# Patient Record
Sex: FEMALE | Race: WHITE | NOT HISPANIC OR LATINO
[De-identification: names, ages, dates, MRNs, and addresses within clinical notes are randomized per-mention and may not be internally consistent; named-entity substitution may affect disease eponyms.]

---

## 2024-03-20 ENCOUNTER — APPOINTMENT (OUTPATIENT)
Dept: UROGYNECOLOGY | Facility: CLINIC | Age: 54
End: 2024-03-20
Payer: COMMERCIAL

## 2024-03-20 VITALS
WEIGHT: 172 LBS | HEIGHT: 67 IN | DIASTOLIC BLOOD PRESSURE: 87 MMHG | BODY MASS INDEX: 27 KG/M2 | SYSTOLIC BLOOD PRESSURE: 148 MMHG | HEART RATE: 82 BPM

## 2024-03-20 PROBLEM — Z00.00 ENCOUNTER FOR PREVENTIVE HEALTH EXAMINATION: Status: ACTIVE | Noted: 2024-03-20

## 2024-03-20 LAB
BILIRUB UR QL STRIP: NORMAL
CLARITY UR: CLEAR
COLLECTION METHOD: NORMAL
GLUCOSE UR-MCNC: NORMAL
HCG UR QL: 0.2 EU/DL
HGB UR QL STRIP.AUTO: NORMAL
KETONES UR-MCNC: NORMAL
LEUKOCYTE ESTERASE UR QL STRIP: NORMAL
NITRITE UR QL STRIP: NORMAL
PH UR STRIP: 6
PROT UR STRIP-MCNC: NORMAL
SP GR UR STRIP: 1.01

## 2024-03-20 PROCEDURE — 99204 OFFICE O/P NEW MOD 45 MIN: CPT | Mod: 25

## 2024-03-20 PROCEDURE — 51701 INSERT BLADDER CATHETER: CPT

## 2024-03-20 PROCEDURE — 81003 URINALYSIS AUTO W/O SCOPE: CPT | Mod: QW

## 2024-03-20 NOTE — PHYSICAL EXAM
[Chaperone Present] : A chaperone was present in the examining room during all aspects of the physical examination [No Acute Distress] : in no acute distress [Oriented x3] : oriented to person, place, and time [Normal Memory] : ~T memory was ~L unimpaired [Normal Mood/Affect] : mood and affect are normal [Labia Majora] : were normal [Labia Minora] : were normal [Rest Stand Full Reduced ____ degree] : Q-Tip Test: Rest Stand Full Reduced [unfilled] degree [Normal Appearance] : general appearance was normal [Aa ____] : Aa [unfilled] [Ba ____] : Ba [unfilled] [GH ____] : GH [unfilled] [C ____] : C [unfilled] [PB ____] : PB [unfilled] [Ap ____] : Ap [unfilled] [TVL ____] : TVL  [unfilled] [Bp ____] : Bp [unfilled] [D ____] : D [unfilled] [Normal] : no abnormalities [Anxiety] : patient is not anxious [Tenderness] : ~T no ~M abdominal tenderness observed [Distended] : not distended [de-identified] : uterus feels enlarged

## 2024-03-20 NOTE — HISTORY OF PRESENT ILLNESS
[Rectal Prolapse] : mild [Feelings Of Urinary Urgency] : moderate [Unable To Restrain Bowel Movement] : no [Urinary Tract Infection] : no [x1] : nocturia once nightly [Stool Visible Blood] : no [Incomplete Emptying Of Stool] : no [de-identified] : 8-10 [FreeTextEntry1] :  Sadie presents for surgical consultation with a h/o uterovaginal prolapse. She has dysmenorrhea and heavy periods and is scheduling hysterectomy with Dr Harrington, she desires concomitant prolapse repair.   PSH: Appendectomy.

## 2024-03-20 NOTE — DISCUSSION/SUMMARY
[FreeTextEntry1] :  We reviewed management options for her prolapse including: observation, pelvic floor exercises with and without PT, pessary, and surgical management. She is scheduling surgery with Dr Harrington and therefore we focused our discussion on surgical management with hysterectomy. We reviewed both mesh and non-mesh options. We reviewed risks of each procedure including recurrence rates and mesh complications. She is interested in either a SC vs USLS. If SC, I recommend GARRY, we reviewed rationale for this. She denies a h/o abnormal paps or other contraindications. If USLS, we discussed total hysterectomy. Written IUGA information on SC and USLS provided for her to review. I recommend preop workup with URD testing.   She will consider her prolapse surgical options further and let me know once she has made a decision.

## 2024-03-20 NOTE — REASON FOR VISIT
[Questionnaire Received] : Patient questionnaire received [Pelvic Organ Prolapse] : pelvic organ prolapse [Intake Form Reviewed] : Patient intake form with past medical history, surgical history, family history and social history reviewed today

## 2024-03-21 ENCOUNTER — NON-APPOINTMENT (OUTPATIENT)
Age: 54
End: 2024-03-21

## 2024-03-21 DIAGNOSIS — Z82.5 FAMILY HISTORY OF ASTHMA AND OTHER CHRONIC LOWER RESPIRATORY DISEASES: ICD-10-CM

## 2024-03-21 DIAGNOSIS — I51.9 HEART DISEASE, UNSPECIFIED: ICD-10-CM

## 2024-03-21 DIAGNOSIS — Z87.828 PERSONAL HISTORY OF OTHER (HEALED) PHYSICAL INJURY AND TRAUMA: ICD-10-CM

## 2024-03-21 DIAGNOSIS — Z83.3 FAMILY HISTORY OF DIABETES MELLITUS: ICD-10-CM

## 2024-03-21 DIAGNOSIS — Z87.09 PERSONAL HISTORY OF OTHER DISEASES OF THE RESPIRATORY SYSTEM: ICD-10-CM

## 2024-03-21 LAB
APPEARANCE: CLEAR
BACTERIA: NEGATIVE /HPF
BILIRUBIN URINE: NEGATIVE
BLOOD URINE: NEGATIVE
CAST: 0 /LPF
COLOR: YELLOW
EPITHELIAL CELLS: 0 /HPF
GLUCOSE QUALITATIVE U: NEGATIVE MG/DL
KETONES URINE: NEGATIVE MG/DL
LEUKOCYTE ESTERASE URINE: NEGATIVE
MICROSCOPIC-UA: NORMAL
NITRITE URINE: NEGATIVE
PH URINE: 6.5
PROTEIN URINE: NEGATIVE MG/DL
RED BLOOD CELLS URINE: 0 /HPF
SPECIFIC GRAVITY URINE: 1.01
UROBILINOGEN URINE: 0.2 MG/DL
WHITE BLOOD CELLS URINE: 0 /HPF

## 2024-03-21 RX ORDER — METOPROLOL SUCCINATE 25 MG/1
25 TABLET, EXTENDED RELEASE ORAL
Refills: 0 | Status: ACTIVE | COMMUNITY

## 2024-03-21 RX ORDER — ESCITALOPRAM OXALATE 10 MG/1
10 TABLET, FILM COATED ORAL
Refills: 0 | Status: ACTIVE | COMMUNITY

## 2024-03-21 RX ORDER — NORETHINDRONE ACETATE AND ETHINYL ESTRADIOL .5; .0025 MG/1; MG/1
TABLET ORAL
Refills: 0 | Status: ACTIVE | COMMUNITY

## 2024-03-22 LAB — BACTERIA UR CULT: NORMAL

## 2024-04-19 ENCOUNTER — OUTPATIENT (OUTPATIENT)
Dept: OUTPATIENT SERVICES | Facility: HOSPITAL | Age: 54
LOS: 1 days | End: 2024-04-19
Payer: COMMERCIAL

## 2024-04-19 ENCOUNTER — APPOINTMENT (OUTPATIENT)
Dept: UROGYNECOLOGY | Facility: CLINIC | Age: 54
End: 2024-04-19
Payer: COMMERCIAL

## 2024-04-19 VITALS
WEIGHT: 175 LBS | DIASTOLIC BLOOD PRESSURE: 82 MMHG | SYSTOLIC BLOOD PRESSURE: 132 MMHG | HEIGHT: 67 IN | BODY MASS INDEX: 27.47 KG/M2 | HEART RATE: 76 BPM

## 2024-04-19 DIAGNOSIS — Z01.818 ENCOUNTER FOR OTHER PREPROCEDURAL EXAMINATION: ICD-10-CM

## 2024-04-19 PROCEDURE — 51797 INTRAABDOMINAL PRESSURE TEST: CPT

## 2024-04-19 PROCEDURE — 51797 INTRAABDOMINAL PRESSURE TEST: CPT | Mod: 26

## 2024-04-19 PROCEDURE — 51784 ANAL/URINARY MUSCLE STUDY: CPT

## 2024-04-19 PROCEDURE — 51729 CYSTOMETROGRAM W/VP&UP: CPT | Mod: 26

## 2024-04-19 PROCEDURE — 51729 CYSTOMETROGRAM W/VP&UP: CPT

## 2024-04-19 PROCEDURE — 51784 ANAL/URINARY MUSCLE STUDY: CPT | Mod: 26

## 2024-06-02 PROBLEM — N81.11 MIDLINE CYSTOCELE: Status: ACTIVE | Noted: 2024-03-20

## 2024-06-02 PROBLEM — N81.2 INCOMPLETE UTEROVAGINAL PROLAPSE: Status: ACTIVE | Noted: 2024-03-20

## 2024-06-02 PROBLEM — N39.3 STRESS INCONTINENCE, FEMALE: Status: ACTIVE | Noted: 2024-04-19

## 2024-06-02 PROBLEM — R35.0 URINATION FREQUENCY: Status: ACTIVE | Noted: 2024-03-20

## 2024-06-04 ENCOUNTER — OUTPATIENT (OUTPATIENT)
Dept: OUTPATIENT SERVICES | Facility: HOSPITAL | Age: 54
LOS: 1 days | End: 2024-06-04
Payer: COMMERCIAL

## 2024-06-04 ENCOUNTER — APPOINTMENT (OUTPATIENT)
Dept: UROGYNECOLOGY | Facility: CLINIC | Age: 54
End: 2024-06-04
Payer: COMMERCIAL

## 2024-06-04 VITALS
SYSTOLIC BLOOD PRESSURE: 153 MMHG | WEIGHT: 175 LBS | HEIGHT: 67 IN | DIASTOLIC BLOOD PRESSURE: 86 MMHG | BODY MASS INDEX: 27.47 KG/M2 | HEART RATE: 85 BPM

## 2024-06-04 VITALS
WEIGHT: 184.09 LBS | OXYGEN SATURATION: 98 % | TEMPERATURE: 98 F | HEART RATE: 75 BPM | DIASTOLIC BLOOD PRESSURE: 77 MMHG | RESPIRATION RATE: 12 BRPM | SYSTOLIC BLOOD PRESSURE: 128 MMHG | HEIGHT: 67 IN

## 2024-06-04 DIAGNOSIS — Z98.890 OTHER SPECIFIED POSTPROCEDURAL STATES: Chronic | ICD-10-CM

## 2024-06-04 DIAGNOSIS — N93.9 ABNORMAL UTERINE AND VAGINAL BLEEDING, UNSPECIFIED: ICD-10-CM

## 2024-06-04 DIAGNOSIS — R10.2 PELVIC AND PERINEAL PAIN: ICD-10-CM

## 2024-06-04 DIAGNOSIS — R35.0 FREQUENCY OF MICTURITION: ICD-10-CM

## 2024-06-04 DIAGNOSIS — N92.0 EXCESSIVE AND FREQUENT MENSTRUATION WITH REGULAR CYCLE: ICD-10-CM

## 2024-06-04 DIAGNOSIS — N39.3 STRESS INCONTINENCE (FEMALE) (MALE): ICD-10-CM

## 2024-06-04 DIAGNOSIS — Z29.9 ENCOUNTER FOR PROPHYLACTIC MEASURES, UNSPECIFIED: ICD-10-CM

## 2024-06-04 DIAGNOSIS — N81.11 CYSTOCELE, MIDLINE: ICD-10-CM

## 2024-06-04 DIAGNOSIS — Z01.818 ENCOUNTER FOR OTHER PREPROCEDURAL EXAMINATION: ICD-10-CM

## 2024-06-04 DIAGNOSIS — Z90.49 ACQUIRED ABSENCE OF OTHER SPECIFIED PARTS OF DIGESTIVE TRACT: Chronic | ICD-10-CM

## 2024-06-04 DIAGNOSIS — N83.202 UNSPECIFIED OVARIAN CYST, LEFT SIDE: ICD-10-CM

## 2024-06-04 DIAGNOSIS — N81.2 INCOMPLETE UTEROVAGINAL PROLAPSE: ICD-10-CM

## 2024-06-04 LAB
A1C WITH ESTIMATED AVERAGE GLUCOSE RESULT: 4.9 % — SIGNIFICANT CHANGE UP (ref 4–5.6)
ANION GAP SERPL CALC-SCNC: 13 MMOL/L — SIGNIFICANT CHANGE UP (ref 5–17)
BLD GP AB SCN SERPL QL: NEGATIVE — SIGNIFICANT CHANGE UP
BUN SERPL-MCNC: 10 MG/DL — SIGNIFICANT CHANGE UP (ref 7–23)
CALCIUM SERPL-MCNC: 9.1 MG/DL — SIGNIFICANT CHANGE UP (ref 8.4–10.5)
CHLORIDE SERPL-SCNC: 104 MMOL/L — SIGNIFICANT CHANGE UP (ref 96–108)
CO2 SERPL-SCNC: 20 MMOL/L — LOW (ref 22–31)
CREAT SERPL-MCNC: 0.64 MG/DL — SIGNIFICANT CHANGE UP (ref 0.5–1.3)
EGFR: 106 ML/MIN/1.73M2 — SIGNIFICANT CHANGE UP
ESTIMATED AVERAGE GLUCOSE: 94 MG/DL — SIGNIFICANT CHANGE UP (ref 68–114)
GLUCOSE SERPL-MCNC: 80 MG/DL — SIGNIFICANT CHANGE UP (ref 70–99)
HCT VFR BLD CALC: 43.7 % — SIGNIFICANT CHANGE UP (ref 34.5–45)
HGB BLD-MCNC: 15.4 G/DL — SIGNIFICANT CHANGE UP (ref 11.5–15.5)
MCHC RBC-ENTMCNC: 30.3 PG — SIGNIFICANT CHANGE UP (ref 27–34)
MCHC RBC-ENTMCNC: 35.2 GM/DL — SIGNIFICANT CHANGE UP (ref 32–36)
MCV RBC AUTO: 86 FL — SIGNIFICANT CHANGE UP (ref 80–100)
NRBC # BLD: 0 /100 WBCS — SIGNIFICANT CHANGE UP (ref 0–0)
PLATELET # BLD AUTO: 316 K/UL — SIGNIFICANT CHANGE UP (ref 150–400)
POTASSIUM SERPL-MCNC: 3.8 MMOL/L — SIGNIFICANT CHANGE UP (ref 3.5–5.3)
POTASSIUM SERPL-SCNC: 3.8 MMOL/L — SIGNIFICANT CHANGE UP (ref 3.5–5.3)
RBC # BLD: 5.08 M/UL — SIGNIFICANT CHANGE UP (ref 3.8–5.2)
RBC # FLD: 13 % — SIGNIFICANT CHANGE UP (ref 10.3–14.5)
RH IG SCN BLD-IMP: NEGATIVE — SIGNIFICANT CHANGE UP
SODIUM SERPL-SCNC: 137 MMOL/L — SIGNIFICANT CHANGE UP (ref 135–145)
WBC # BLD: 8.43 K/UL — SIGNIFICANT CHANGE UP (ref 3.8–10.5)
WBC # FLD AUTO: 8.43 K/UL — SIGNIFICANT CHANGE UP (ref 3.8–10.5)

## 2024-06-04 PROCEDURE — 86850 RBC ANTIBODY SCREEN: CPT

## 2024-06-04 PROCEDURE — 85027 COMPLETE CBC AUTOMATED: CPT

## 2024-06-04 PROCEDURE — G0463: CPT

## 2024-06-04 PROCEDURE — 86900 BLOOD TYPING SEROLOGIC ABO: CPT

## 2024-06-04 PROCEDURE — 36415 COLL VENOUS BLD VENIPUNCTURE: CPT

## 2024-06-04 PROCEDURE — 80048 BASIC METABOLIC PNL TOTAL CA: CPT

## 2024-06-04 PROCEDURE — 99214 OFFICE O/P EST MOD 30 MIN: CPT

## 2024-06-04 PROCEDURE — 99459 PELVIC EXAMINATION: CPT

## 2024-06-04 PROCEDURE — 83036 HEMOGLOBIN GLYCOSYLATED A1C: CPT

## 2024-06-04 PROCEDURE — 86901 BLOOD TYPING SEROLOGIC RH(D): CPT

## 2024-06-04 RX ORDER — CEFOTETAN DISODIUM 2 G
2 VIAL (EA) INJECTION ONCE
Refills: 0 | Status: DISCONTINUED | OUTPATIENT
Start: 2024-06-26 | End: 2024-06-27

## 2024-06-04 NOTE — H&P PST ADULT - NSICDXPASTSURGICALHX_GEN_ALL_CORE_FT
PAST SURGICAL HISTORY:  History of appendectomy     History of elbow surgery     S/P lumbar spine operation

## 2024-06-04 NOTE — H&P PST ADULT - PROBLEM SELECTOR PLAN 1
Laparoscopic Robotic Sacral Colpopexy, Uterosacral Suspension, Ligament Anterior Posterior Colporrhaphy Repair Possible Midurethral Sling, Laparoscopic Total Supracervical Hysterectomy, Laparoscopic Bilateral Salpingectomy on 6/26/24.  CBC, BMP, T&S, HemA1c done today at [facility].   UC done today in Dr. Dawkins office.   Pre op instructions, including chlorhexidine, provided and all questions answered.   UCG on DOS, specimen container provided.

## 2024-06-04 NOTE — H&P PST ADULT - NSICDXPASTMEDICALHX_GEN_ALL_CORE_FT
PAST MEDICAL HISTORY:  Anxiety     Asthma, exercise induced     Dysmenorrhea     Menorrhagia     PATRICIA (stress urinary incontinence, female)     Uterovaginal prolapse

## 2024-06-04 NOTE — H&P PST ADULT - HISTORY OF PRESENT ILLNESS
54 year old female with PMH of anxiety (on Metoprolol for anxiety symptoms), exercise induced asthma (states she has not used inhaler in years), uterovaginal prolapse, dysmenorrhea and heavy periods. She presents today to PST prior to scheduled Laparoscopic Robotic Sacral Colpopexy, Uterosacral Suspension, Ligament Anterior Posterior Colporrhaphy Repair Possible Midurethral Sling, Laparoscopic Total Supracervical Hysterectomy, Laparoscopic Bilateral Salpingectomy on 6/26/24. Patient denies recent fever, chills, chest pain, SOB, palpitations, or recent exposure to COVID-19.

## 2024-06-04 NOTE — H&P PST ADULT - ASSESSMENT
Activity: Patient states she walks for exercise and is active with her dogs and caring for her kids, and has no symptoms.     DASI: 8.97    Mallampati: 1    Dental: Denies loose teeth or dentures.     CAPRINI VTE 2.0 SCORE [CLOT updated 2019]    AGE RELATED RISK FACTORS                                                       MOBILITY RELATED FACTORS  [X ] Age 41-60 years                                            (1 Point)                    [ ] Bed rest                                                        (1 Point)  [ ] Age: 61-74 years                                           (2 Points)                  [ ] Plaster cast                                                   (2 Points)  [ ] Age= 75 years                                              (3 Points)                    [ ] Bed bound for more than 72 hours                 (2 Points)    DISEASE RELATED RISK FACTORS                                               GENDER SPECIFIC FACTORS  [ ] Edema in the lower extremities                       (1 Point)              [ ] Pregnancy                                                     (1 Point)  [ ] Varicose veins                                               (1 Point)                     [ ] Post-partum < 6 weeks                                   (1 Point)             [X ] BMI > 25 Kg/m2                                            (1 Point)                     [ ] Hormonal therapy  or oral contraception          (1 Point)                 [ ] Sepsis (in the previous month)                        (1 Point)               [ ] History of pregnancy complications                 (1 point)  [ ] Pneumonia or serious lung disease                                               [ ] Unexplained or recurrent                     (1 Point)           (in the previous month)                               (1 Point)  [ ] Abnormal pulmonary function test                     (1 Point)                 SURGERY RELATED RISK FACTORS  [ ] Acute myocardial infarction                              (1 Point)               [ ]  Section                                             (1 Point)  [ ] Congestive heart failure (in the previous month)  (1 Point)      [ ] Minor surgery                                                  (1 Point)   [ ] Inflammatory bowel disease                             (1 Point)               [ ] Arthroscopic surgery                                        (2 Points)  [ ] Central venous access                                      (2 Points)                [X ] General surgery lasting more than 45 minutes (2 points)  [ ] Malignancy- Present or previous                   (2 Points)                [ ] Elective arthroplasty                                         (5 points)    [ ] Stroke (in the previous month)                          (5 Points)                                                                                                                                                           HEMATOLOGY RELATED FACTORS                                                 TRAUMA RELATED RISK FACTORS  [ ] Prior episodes of VTE                                     (3 Points)                [ ] Fracture of the hip, pelvis, or leg                       (5 Points)  [ ] Positive family history for VTE                         (3 Points)             [ ] Acute spinal cord injury (in the previous month)  (5 Points)  [ ] Prothrombin 13169 A                                     (3 Points)               [ ] Paralysis  (less than 1 month)                             (5 Points)  [ ] Factor V Leiden                                             (3 Points)                  [ ] Multiple Trauma within 1 month                        (5 Points)  [ ] Lupus anticoagulants                                     (3 Points)                                                           [ ] Anticardiolipin antibodies                               (3 Points)                                                       [ ] High homocysteine in the blood                      (3 Points)                                             [ ] Other congenital or acquired thrombophilia      (3 Points)                                                [ ] Heparin induced thrombocytopenia                  (3 Points)                                     Total Score [    4      ]

## 2024-06-05 NOTE — HISTORY OF PRESENT ILLNESS
[FreeTextEntry1] : Sadie is a 55 yo with stage PO, occult PATRICIA, and OAB here for pre-surgical counseling.  After considering her options, she would like a SCP/ GARRY. Patient denies any new complaints.  She continues to desire surgical management and is scheduled for combined case with Dr. Harrington on 24. Her pre-operative workup is as follows:   UDS (2024): senior care 350 mL, +-300 mL, no comment on DO, PVR 0 cc. Pelvic US performed at [facility], she will request records be sent here Pap 2024 (HIE clin viewer) NILM, HPV neg [ x] UCx collected today [ ] Medical clearance   PMH: AUB PSH: LSC appendectomy, Chris Ferdinand surgery, elbow tendon repair OBGYN: denies abnormal paps,  x 4, known AUB Allergies: percocet causes feeling of "locked in" with no pain relief and feeling paralyzed

## 2024-06-05 NOTE — PHYSICAL EXAM
[Chaperone Present] : A chaperone was present in the examining room during all aspects of the physical examination [[id number]] : A chaperone was present during the pelvic exam. [No Acute Distress] : in no acute distress [Well developed] : well developed [Well Nourished] : ~L well nourished [Normal Gait] : gait was normal [Labia Majora] : were normal [Labia Minora] : were normal [Normal Appearance] : general appearance was normal [Uterine Adnexae] : were not tender and not enlarged [Normal] : no abnormalities [Exam Deferred] : was deferred [FreeTextEntry1] : General: Well appearing. Alert and oriented. No acute distress. HEENT: Normocephalic, atraumatic, extraocular muscles appear to be intact. Neck: Full range of motion, no obvious lymphadenopathy, deformities, or masses noted. Respiratory: Speaking in full sentences comfortably. Normal work of breathing. No cough during visit. Musculoskeletal: Active full range of motion in extremities. Skin: No obvious rash or skin lesions. Neuro: Oriented x3. Speech is fluent, normal rate. Psych: Normal mood and affect. [Tenderness] : ~T no ~M abdominal tenderness observed [Distended] : not distended [de-identified] : Aa 0, Ba 0, C -1, GH 3, PB 2, TVL 9, Ap -2, Bp -2 and D -4.

## 2024-06-05 NOTE — DISCUSSION/SUMMARY
[FreeTextEntry1] : Sadie is a 53 yo who presents with Stage II prolapse and occult stress urinary incontinence. We reviewed management options for her prolapse including: observation, pelvic floor exercises, resuming pessary use, surgical management. We reviewed various surgical management options including vaginal, laparoscopic/robotic, and abdominal procedures. We also reviewed both mesh and non-mesh options. She is interested in robotic supracervical hysterectomy and sacrocolpopexy. We discussed her UDS findings of PATRICIA and reviewed treatment options. She desires a midurethral sling at the time of her prolapse repair. We discussed the fact that this sling was only indicated to treat occult PATRICIA rather than OAB symptoms.    She denies a history of abnormal Pap smears in the past, and a recent Pap smear is negative. Risks and benefits of total hysterectomy vs. supracervical hysterectomy were discussed with patient, and she opted for supracervical hysterectomy. She is aware that her cervix will be left in situ and she will require continued routine screening. We discussed the risks and benefits of removing her ovaries at the time of surgery, and she would like to keep her ovaries in situ. We discussed removing ovaries only in the unlikely event that they appeared abnormal or for hemostasis. We will therefore plan to proceed with a bilateral salpingectomy only at the time of the procedure.   We reviewed risks of the surgery including but not limited to: bleeding, infection, pain, urinary retention, failure to reduce prolapse, prolapse recurrence, persistence or worsening of stress urinary incontinence, development of urge incontinence, voiding dysfunction, dyspareunia, bowel obstruction, venous thromboembolism (VTE), fistula formation, neuropathy, and mesh erosion. We discussed the risk of possible conversion to open surgery via exploratory laparotomy. We discussed the risk of injury to her bladder, ureters, urethra, vagina, rectum and bowel. We discussed the possibility of going home with a catheter. The risks and procedure details were explained in layman's terms and she expressed understanding of all of these risks. We discussed the elective nature of the surgery and we discussed that she is choosing to undergo this surgery despite awareness and understanding of procedure risks. We reviewed her hospital stay as well as preoperative and postoperative instructions.   Patient signed consent for: pelvic exam under anesthesia, robotic/laparoscopic-assisted supracervical hysterectomy, bilateral salpingectomy, sacrocolpopexy, midurethral sling with mesh, cystoscopy; possible anterior/posterior repair, oophorectomy, laparotomy. Pt understands that pelvic exam under anesthesia can be performed by learners (medical students/residents/fellows).  Patient verbalized understanding. All questions answered.

## 2024-06-06 ENCOUNTER — NON-APPOINTMENT (OUTPATIENT)
Age: 54
End: 2024-06-06

## 2024-06-06 LAB — BACTERIA UR CULT: NORMAL

## 2024-06-25 ENCOUNTER — TRANSCRIPTION ENCOUNTER (OUTPATIENT)
Age: 54
End: 2024-06-25

## 2024-06-26 ENCOUNTER — APPOINTMENT (OUTPATIENT)
Dept: UROGYNECOLOGY | Facility: HOSPITAL | Age: 54
End: 2024-06-26
Payer: COMMERCIAL

## 2024-06-26 ENCOUNTER — INPATIENT (INPATIENT)
Facility: HOSPITAL | Age: 54
LOS: 0 days | Discharge: HOME | DRG: 743 | End: 2024-06-27
Attending: STUDENT IN AN ORGANIZED HEALTH CARE EDUCATION/TRAINING PROGRAM | Admitting: STUDENT IN AN ORGANIZED HEALTH CARE EDUCATION/TRAINING PROGRAM
Payer: COMMERCIAL

## 2024-06-26 VITALS
TEMPERATURE: 98 F | HEART RATE: 78 BPM | WEIGHT: 184.09 LBS | SYSTOLIC BLOOD PRESSURE: 137 MMHG | OXYGEN SATURATION: 99 % | HEIGHT: 67.01 IN | RESPIRATION RATE: 16 BRPM | DIASTOLIC BLOOD PRESSURE: 81 MMHG

## 2024-06-26 DIAGNOSIS — Z90.49 ACQUIRED ABSENCE OF OTHER SPECIFIED PARTS OF DIGESTIVE TRACT: Chronic | ICD-10-CM

## 2024-06-26 DIAGNOSIS — Z98.890 OTHER SPECIFIED POSTPROCEDURAL STATES: Chronic | ICD-10-CM

## 2024-06-26 DIAGNOSIS — N92.0 EXCESSIVE AND FREQUENT MENSTRUATION WITH REGULAR CYCLE: ICD-10-CM

## 2024-06-26 DIAGNOSIS — R35.0 FREQUENCY OF MICTURITION: ICD-10-CM

## 2024-06-26 LAB
GLUCOSE BLDC GLUCOMTR-MCNC: 83 MG/DL — SIGNIFICANT CHANGE UP (ref 70–99)
HCG UR QL: NEGATIVE — SIGNIFICANT CHANGE UP
HCT VFR BLD CALC: 43.5 % — SIGNIFICANT CHANGE UP (ref 34.5–45)
HGB BLD-MCNC: 15.4 G/DL — SIGNIFICANT CHANGE UP (ref 11.5–15.5)

## 2024-06-26 PROCEDURE — 57425 LAPAROSCOPY SURG COLPOPEXY: CPT

## 2024-06-26 PROCEDURE — 88305 TISSUE EXAM BY PATHOLOGIST: CPT | Mod: 26

## 2024-06-26 DEVICE — SURGIFLO MATRIX WITH THROMBIN KIT: Type: IMPLANTABLE DEVICE | Site: BILATERAL | Status: FUNCTIONAL

## 2024-06-26 DEVICE — MESH UPSYLON Y: Type: IMPLANTABLE DEVICE | Site: BILATERAL | Status: FUNCTIONAL

## 2024-06-26 RX ORDER — METOPROLOL TARTRATE 50 MG
25 TABLET ORAL DAILY
Refills: 0 | Status: DISCONTINUED | OUTPATIENT
Start: 2024-06-26 | End: 2024-06-27

## 2024-06-26 RX ORDER — CELECOXIB 100 MG/1
400 CAPSULE ORAL ONCE
Refills: 0 | Status: COMPLETED | OUTPATIENT
Start: 2024-06-26 | End: 2024-06-26

## 2024-06-26 RX ORDER — ESCITALOPRAM OXALATE 10 MG/1
1 TABLET, FILM COATED ORAL
Refills: 0 | DISCHARGE

## 2024-06-26 RX ORDER — GABAPENTIN
600 POWDER (GRAM) MISCELLANEOUS ONCE
Refills: 0 | Status: COMPLETED | OUTPATIENT
Start: 2024-06-26 | End: 2024-06-26

## 2024-06-26 RX ORDER — LIDOCAINE HYDROCHLORIDE 20 MG/ML
0.2 INJECTION, SOLUTION EPIDURAL; INFILTRATION; INTRACAUDAL; PERINEURAL ONCE
Refills: 0 | Status: DISCONTINUED | OUTPATIENT
Start: 2024-06-26 | End: 2024-06-26

## 2024-06-26 RX ORDER — ACETAMINOPHEN 325 MG
1000 TABLET ORAL EVERY 6 HOURS
Refills: 0 | Status: DISCONTINUED | OUTPATIENT
Start: 2024-06-26 | End: 2024-06-27

## 2024-06-26 RX ORDER — METOPROLOL TARTRATE 50 MG
1 TABLET ORAL
Refills: 0 | DISCHARGE

## 2024-06-26 RX ORDER — DEXTROSE MONOHYDRATE AND SODIUM CHLORIDE 5; .3 G/100ML; G/100ML
1000 INJECTION, SOLUTION INTRAVENOUS
Refills: 0 | Status: DISCONTINUED | OUTPATIENT
Start: 2024-06-26 | End: 2024-06-27

## 2024-06-26 RX ORDER — KETOROLAC TROMETHAMINE 30 MG/ML
15 INJECTION, SOLUTION INTRAMUSCULAR ONCE
Refills: 0 | Status: DISCONTINUED | OUTPATIENT
Start: 2024-06-26 | End: 2024-06-26

## 2024-06-26 RX ORDER — ONDANSETRON HYDROCHLORIDE 2 MG/ML
8 INJECTION INTRAMUSCULAR; INTRAVENOUS EVERY 8 HOURS
Refills: 0 | Status: DISCONTINUED | OUTPATIENT
Start: 2024-06-26 | End: 2024-06-27

## 2024-06-26 RX ORDER — HYDROMORPHONE HCL 0.2 MG/ML
0.5 INJECTION, SOLUTION INTRAVENOUS
Refills: 0 | Status: DISCONTINUED | OUTPATIENT
Start: 2024-06-26 | End: 2024-06-26

## 2024-06-26 RX ORDER — SODIUM CHLORIDE 0.9 % (FLUSH) 0.9 %
3 SYRINGE (ML) INJECTION EVERY 8 HOURS
Refills: 0 | Status: DISCONTINUED | OUTPATIENT
Start: 2024-06-26 | End: 2024-06-26

## 2024-06-26 RX ORDER — NORETHINDRONE AND ETHINYL ESTRADIOL 0.4-0.035
1 KIT ORAL
Refills: 0 | DISCHARGE

## 2024-06-26 RX ORDER — SIMETHICONE 40MG/0.6ML
80 SUSPENSION, DROPS(FINAL DOSAGE FORM)(ML) ORAL EVERY 6 HOURS
Refills: 0 | Status: DISCONTINUED | OUTPATIENT
Start: 2024-06-26 | End: 2024-06-27

## 2024-06-26 RX ORDER — ESCITALOPRAM OXALATE 20 MG/1
10 TABLET, FILM COATED ORAL DAILY
Refills: 0 | Status: DISCONTINUED | OUTPATIENT
Start: 2024-06-26 | End: 2024-06-27

## 2024-06-26 RX ORDER — ACETAMINOPHEN 325 MG
1000 TABLET ORAL ONCE
Refills: 0 | Status: COMPLETED | OUTPATIENT
Start: 2024-06-26 | End: 2024-06-26

## 2024-06-26 RX ORDER — OXYCODONE HYDROCHLORIDE 100 MG/5ML
5 SOLUTION ORAL EVERY 6 HOURS
Refills: 0 | Status: DISCONTINUED | OUTPATIENT
Start: 2024-06-26 | End: 2024-06-27

## 2024-06-26 RX ADMIN — CELECOXIB 400 MILLIGRAM(S): 100 CAPSULE ORAL at 07:22

## 2024-06-26 RX ADMIN — HYDROMORPHONE HCL 0.5 MILLIGRAM(S): 0.2 INJECTION, SOLUTION INTRAVENOUS at 14:30

## 2024-06-26 RX ADMIN — HYDROMORPHONE HCL 0.5 MILLIGRAM(S): 0.2 INJECTION, SOLUTION INTRAVENOUS at 14:15

## 2024-06-26 RX ADMIN — HYDROMORPHONE HCL 0.5 MILLIGRAM(S): 0.2 INJECTION, SOLUTION INTRAVENOUS at 13:44

## 2024-06-26 RX ADMIN — DEXTROSE MONOHYDRATE AND SODIUM CHLORIDE 75 MILLILITER(S): 5; .3 INJECTION, SOLUTION INTRAVENOUS at 13:44

## 2024-06-26 RX ADMIN — Medication 25 MILLIGRAM(S): at 21:20

## 2024-06-26 RX ADMIN — Medication 600 MILLIGRAM(S): at 21:50

## 2024-06-26 RX ADMIN — HYDROMORPHONE HCL 0.5 MILLIGRAM(S): 0.2 INJECTION, SOLUTION INTRAVENOUS at 13:59

## 2024-06-26 RX ADMIN — Medication 600 MILLIGRAM(S): at 21:21

## 2024-06-26 RX ADMIN — Medication 1000 MILLIGRAM(S): at 18:11

## 2024-06-26 RX ADMIN — Medication 600 MILLIGRAM(S): at 07:22

## 2024-06-26 RX ADMIN — KETOROLAC TROMETHAMINE 15 MILLIGRAM(S): 30 INJECTION, SOLUTION INTRAMUSCULAR at 15:10

## 2024-06-26 RX ADMIN — Medication 1000 MILLIGRAM(S): at 07:22

## 2024-06-26 RX ADMIN — Medication 1 APPLICATION(S): at 07:23

## 2024-06-26 RX ADMIN — KETOROLAC TROMETHAMINE 15 MILLIGRAM(S): 30 INJECTION, SOLUTION INTRAMUSCULAR at 15:25

## 2024-06-27 ENCOUNTER — TRANSCRIPTION ENCOUNTER (OUTPATIENT)
Age: 54
End: 2024-06-27

## 2024-06-27 VITALS
TEMPERATURE: 99 F | DIASTOLIC BLOOD PRESSURE: 72 MMHG | RESPIRATION RATE: 18 BRPM | SYSTOLIC BLOOD PRESSURE: 131 MMHG | HEART RATE: 65 BPM | OXYGEN SATURATION: 97 %

## 2024-06-27 LAB
ANION GAP SERPL CALC-SCNC: 12 MMOL/L — SIGNIFICANT CHANGE UP (ref 5–17)
BUN SERPL-MCNC: 11 MG/DL — SIGNIFICANT CHANGE UP (ref 7–23)
CALCIUM SERPL-MCNC: 8.1 MG/DL — LOW (ref 8.4–10.5)
CHLORIDE SERPL-SCNC: 101 MMOL/L — SIGNIFICANT CHANGE UP (ref 96–108)
CO2 SERPL-SCNC: 21 MMOL/L — LOW (ref 22–31)
CREAT SERPL-MCNC: 0.65 MG/DL — SIGNIFICANT CHANGE UP (ref 0.5–1.3)
EGFR: 105 ML/MIN/1.73M2 — SIGNIFICANT CHANGE UP
GLUCOSE SERPL-MCNC: 98 MG/DL — SIGNIFICANT CHANGE UP (ref 70–99)
HCT VFR BLD CALC: 38.6 % — SIGNIFICANT CHANGE UP (ref 34.5–45)
HGB BLD-MCNC: 13 G/DL — SIGNIFICANT CHANGE UP (ref 11.5–15.5)
MCHC RBC-ENTMCNC: 29.9 PG — SIGNIFICANT CHANGE UP (ref 27–34)
MCHC RBC-ENTMCNC: 33.7 GM/DL — SIGNIFICANT CHANGE UP (ref 32–36)
MCV RBC AUTO: 88.7 FL — SIGNIFICANT CHANGE UP (ref 80–100)
NRBC # BLD: 0 /100 WBCS — SIGNIFICANT CHANGE UP (ref 0–0)
PLATELET # BLD AUTO: 262 K/UL — SIGNIFICANT CHANGE UP (ref 150–400)
POTASSIUM SERPL-MCNC: 4.1 MMOL/L — SIGNIFICANT CHANGE UP (ref 3.5–5.3)
POTASSIUM SERPL-SCNC: 4.1 MMOL/L — SIGNIFICANT CHANGE UP (ref 3.5–5.3)
RBC # BLD: 4.35 M/UL — SIGNIFICANT CHANGE UP (ref 3.8–5.2)
RBC # FLD: 13 % — SIGNIFICANT CHANGE UP (ref 10.3–14.5)
SODIUM SERPL-SCNC: 134 MMOL/L — LOW (ref 135–145)
WBC # BLD: 11.44 K/UL — HIGH (ref 3.8–10.5)
WBC # FLD AUTO: 11.44 K/UL — HIGH (ref 3.8–10.5)

## 2024-06-27 PROCEDURE — C1889: CPT

## 2024-06-27 PROCEDURE — 85018 HEMOGLOBIN: CPT

## 2024-06-27 PROCEDURE — C9399: CPT

## 2024-06-27 PROCEDURE — 85014 HEMATOCRIT: CPT

## 2024-06-27 PROCEDURE — 88305 TISSUE EXAM BY PATHOLOGIST: CPT

## 2024-06-27 PROCEDURE — 86901 BLOOD TYPING SEROLOGIC RH(D): CPT

## 2024-06-27 PROCEDURE — 81025 URINE PREGNANCY TEST: CPT

## 2024-06-27 PROCEDURE — C1781: CPT

## 2024-06-27 PROCEDURE — 86900 BLOOD TYPING SEROLOGIC ABO: CPT

## 2024-06-27 PROCEDURE — 80048 BASIC METABOLIC PNL TOTAL CA: CPT

## 2024-06-27 PROCEDURE — 85027 COMPLETE CBC AUTOMATED: CPT

## 2024-06-27 PROCEDURE — 86850 RBC ANTIBODY SCREEN: CPT

## 2024-06-27 PROCEDURE — 82962 GLUCOSE BLOOD TEST: CPT

## 2024-06-27 PROCEDURE — S2900: CPT

## 2024-06-27 RX ORDER — OXYCODONE HYDROCHLORIDE 100 MG/5ML
1 SOLUTION ORAL
Qty: 5 | Refills: 0
Start: 2024-06-27

## 2024-06-27 RX ORDER — ACETAMINOPHEN 325 MG
2 TABLET ORAL
Qty: 0 | Refills: 0 | DISCHARGE
Start: 2024-06-27

## 2024-06-27 RX ADMIN — Medication 600 MILLIGRAM(S): at 09:37

## 2024-06-27 RX ADMIN — Medication 80 MILLIGRAM(S): at 03:44

## 2024-06-27 RX ADMIN — Medication 1000 MILLIGRAM(S): at 00:40

## 2024-06-27 RX ADMIN — Medication 600 MILLIGRAM(S): at 04:15

## 2024-06-27 RX ADMIN — Medication 600 MILLIGRAM(S): at 03:44

## 2024-06-27 RX ADMIN — Medication 1000 MILLIGRAM(S): at 06:20

## 2024-06-27 RX ADMIN — Medication 1000 MILLIGRAM(S): at 12:16

## 2024-06-27 RX ADMIN — Medication 1000 MILLIGRAM(S): at 12:46

## 2024-06-27 RX ADMIN — ESCITALOPRAM OXALATE 10 MILLIGRAM(S): 20 TABLET, FILM COATED ORAL at 09:40

## 2024-06-27 RX ADMIN — Medication 1000 MILLIGRAM(S): at 06:55

## 2024-06-27 RX ADMIN — Medication 600 MILLIGRAM(S): at 09:59

## 2024-06-27 RX ADMIN — Medication 1000 MILLIGRAM(S): at 00:08

## 2024-07-06 LAB — SURGICAL PATHOLOGY STUDY: SIGNIFICANT CHANGE UP

## 2024-07-09 ENCOUNTER — APPOINTMENT (OUTPATIENT)
Dept: UROGYNECOLOGY | Facility: CLINIC | Age: 54
End: 2024-07-09
Payer: COMMERCIAL

## 2024-07-09 DIAGNOSIS — L03.818 CELLULITIS OF OTHER SITES: ICD-10-CM

## 2024-07-09 PROBLEM — J45.990 EXERCISE INDUCED BRONCHOSPASM: Chronic | Status: ACTIVE | Noted: 2024-06-04

## 2024-07-09 PROBLEM — F41.9 ANXIETY DISORDER, UNSPECIFIED: Chronic | Status: ACTIVE | Noted: 2024-06-04

## 2024-07-09 PROBLEM — N94.6 DYSMENORRHEA, UNSPECIFIED: Chronic | Status: ACTIVE | Noted: 2024-06-04

## 2024-07-09 PROBLEM — N39.3 STRESS INCONTINENCE (FEMALE) (MALE): Chronic | Status: ACTIVE | Noted: 2024-06-04

## 2024-07-09 PROBLEM — N81.4 UTEROVAGINAL PROLAPSE, UNSPECIFIED: Chronic | Status: ACTIVE | Noted: 2024-06-04

## 2024-07-09 PROBLEM — N92.0 EXCESSIVE AND FREQUENT MENSTRUATION WITH REGULAR CYCLE: Chronic | Status: ACTIVE | Noted: 2024-06-04

## 2024-07-09 PROCEDURE — 99024 POSTOP FOLLOW-UP VISIT: CPT

## 2024-07-09 RX ORDER — AMOXICILLIN AND CLAVULANATE POTASSIUM 875; 125 MG/1; MG/1
875-125 TABLET, COATED ORAL
Qty: 14 | Refills: 0 | Status: ACTIVE | COMMUNITY
Start: 2024-07-09 | End: 1900-01-01

## 2024-07-23 ENCOUNTER — APPOINTMENT (OUTPATIENT)
Dept: UROGYNECOLOGY | Facility: CLINIC | Age: 54
End: 2024-07-23
Payer: COMMERCIAL

## 2024-07-23 VITALS
DIASTOLIC BLOOD PRESSURE: 80 MMHG | HEIGHT: 67 IN | WEIGHT: 175 LBS | SYSTOLIC BLOOD PRESSURE: 124 MMHG | BODY MASS INDEX: 27.47 KG/M2

## 2024-07-23 DIAGNOSIS — S31.109A UNSPECIFIED OPEN WOUND OF ABDOMINAL WALL, UNSPECIFIED QUADRANT W/OUT PENETRATION INTO PERITONEAL CAVITY, INITIAL ENCOUNTER: ICD-10-CM

## 2024-07-23 DIAGNOSIS — Z98.890 OTHER SPECIFIED POSTPROCEDURAL STATES: ICD-10-CM

## 2024-07-23 PROCEDURE — 99024 POSTOP FOLLOW-UP VISIT: CPT

## 2024-07-23 RX ORDER — MUPIROCIN 20 MG/G
2 OINTMENT TOPICAL 3 TIMES DAILY
Qty: 30 | Refills: 0 | Status: ACTIVE | COMMUNITY
Start: 2024-07-23 | End: 1900-01-01

## 2024-07-23 NOTE — DISCUSSION/SUMMARY
[FreeTextEntry1] : Sadie is a 53 yo who is s/p RA-GARRY, SCP, BS, MUS, cysto, vulvar biopsy (6/26/24), combo case with Dr. Harrington, doing well overall. Evidence of superficial surgical site infection of right-most port site appears to be improved after Augmentin course.  #SSI - Physical exam findings concerning for SSI at last visit, appears improved today.  - Adherent to Augmentin x 7d + yogurt as probiotic - Apparent skin sensitivity to Dermabond noted. Dermabond removed and Bacitracin placed over all incisions today with Tegederm dressing placed over right-most port site - Rx for Mupirocin sent to pharmacy to use TID as needed - Low concern for systemic infection  #Postop - Healing well postoperatively - Postoperative restrictions, instructions, and bleeding precautions reviewed. - She was counseled to call if any questions and RTO in 2-3 weeks or sooner for 6 wk postop visit, should issues arise.  Patient verbalized understanding.  All questions answered.

## 2024-07-23 NOTE — OBJECTIVE
[Clean, Dry, Intact] : Clean, Dry, Intact [FreeTextEntry2] :  right lateral port site with erythema only at dermabond sites, incision intact, no induration, fluctuance, drainage. All other incisions clean, dry, intact. Erythema noted under remaining dermabond at each incision site.  [FreeTextEntry3] : deferred as she has no complaints

## 2024-07-23 NOTE — SUBJECTIVE
[FreeTextEntry1] : Feels generally well, denies fever/chills [FreeTextEntry7] : Pain well-controlled [FreeTextEntry6] : Tolerating regular diet without nausea [FreeTextEntry5] : Denies PATRICIA or incomplete bladder emptying. Denies urinary frequency, urgency, or dysuria. [FreeTextEntry4] : Having regular BMs without issue [FreeTextEntry3] : Ambulating well without issue [FreeTextEntry9] : She reports adherence to Augmentin and she thinks that her SSI is improving. She also took yogurt as probiotic. She does report bothersome itching of all incisions.

## 2024-08-07 ENCOUNTER — APPOINTMENT (OUTPATIENT)
Dept: UROGYNECOLOGY | Facility: CLINIC | Age: 54
End: 2024-08-07

## 2024-08-07 PROCEDURE — 99024 POSTOP FOLLOW-UP VISIT: CPT

## 2024-08-07 NOTE — SUBJECTIVE
[FreeTextEntry1] : Feels generally well.  Patient completed course of abx and feels improved. [FreeTextEntry6] : Eating well. [FreeTextEntry5] : Denies PATRICIA.  Denies urinary urgency, frequency, or dysuria. [FreeTextEntry4] : Having daily BMs without issue. [FreeTextEntry3] : Ambulating daily without issue.

## 2024-08-07 NOTE — DISCUSSION/SUMMARY
[FreeTextEntry1] : Sadie is a 54F s/p RA-GARRY, SCP, BS, MUS, cysto, vulvar biopsy (6/26/24), combo case with Dr. Harrington, doing well.    1) Postoperative state -Discussed that patient no longer has any postoperative restrictions. -Surgical site infection resolved. -She may follow up with urogynecology as needed. Patient verbalized understanding.  All questions answered.

## 2024-08-07 NOTE — OBJECTIVE
[Soft and Nontender] : soft and nontender [Clean, Dry, Intact] : Clean, Dry, Intact [Good Support] : Good support [Healing well] : healing well [No Masses or Tenderness] : no masses or tenderness [FreeTextEntry1] : No rashes or evidence of infection [FreeTextEntry3] : No mesh tenderness or exposure.

## (undated) DEVICE — SUT CLIP LAPRA-TY ABSORBABLE SIZE 0.118 TO 0.12" VIOLET

## (undated) DEVICE — TUBING STRYKEFLOW II SUCTION / IRRIGATOR

## (undated) DEVICE — XI ARM NEEDLE DRIVER LARGE

## (undated) DEVICE — SUT GORETEX CV-2 (0) 36" PH-24

## (undated) DEVICE — POSITIONER PURPLE ARM ONE STEP (LARGE)

## (undated) DEVICE — BLADE SCALPEL SAFETYLOCK #11

## (undated) DEVICE — XI ARM FORCEP TENACULUM

## (undated) DEVICE — TUBING AIRSEAL TRI-LUMEN FILTERED

## (undated) DEVICE — ELCTR BOVIE PENCIL SMOKE EVACUATION

## (undated) DEVICE — MARKING PEN W RULER

## (undated) DEVICE — LONE STAR ELASTIC STAY HOOK 5MM SHARP

## (undated) DEVICE — XI DRAPE COLUMN

## (undated) DEVICE — DRAPE GYN W LEGGINGS 3X125"

## (undated) DEVICE — SUT POLYSORB 0 30" GS-21 UNDYED

## (undated) DEVICE — POSITIONER FOAM EGG CRATE ULNAR 2PCS (PINK)

## (undated) DEVICE — XI DRAPE ARM

## (undated) DEVICE — SPECIMEN CONTAINER 100ML

## (undated) DEVICE — PREP CHLOROHEXIDINE 4% 118CC KIT

## (undated) DEVICE — SOL IRR POUR NS 0.9% 500ML

## (undated) DEVICE — XI SEAL UNIVERSIAL 5-12MM

## (undated) DEVICE — SOL INJ NS 0.9% 1000ML

## (undated) DEVICE — XI ARM SCISSOR MONO CURVED

## (undated) DEVICE — INSUFFLATION NDL COVIDIEN STEP 14G FOR STEP/VERSASTEP

## (undated) DEVICE — XI TIP COVER

## (undated) DEVICE — MEDICATION LABELS W MARKER

## (undated) DEVICE — VISITEC 4X4

## (undated) DEVICE — SUT VLOC 180 2-0 6" GS-22 GREEN

## (undated) DEVICE — DRAPE 3/4 SHEET W REINFORCEMENT 56X77"

## (undated) DEVICE — DRAPE TOWEL BLUE 17" X 24"

## (undated) DEVICE — FOLEY CATH 2-WAY 18FR 5CC LATEX HYDROGEL

## (undated) DEVICE — SOL INJ NS 0.9% 100ML

## (undated) DEVICE — LAP PAD 4 X 18"

## (undated) DEVICE — ENDOCATCH 10MM SPECIMEN POUCH

## (undated) DEVICE — TROCAR SURGIQUEST AIRSEAL 8MMX100MM

## (undated) DEVICE — NDL HYPO SAFE 22G X 1.5" (BLACK)

## (undated) DEVICE — NDL COUNTER FOAM AND MAGNET 40-70

## (undated) DEVICE — XI ARM GRASPER TIP UP FENESTRATED

## (undated) DEVICE — XI ARM FORCEP MARYLAND BIPOLAR

## (undated) DEVICE — D HELP - CLEARVIEW CLEARIFY SYSTEM

## (undated) DEVICE — SUT POLYSORB 3-0 30" V-20 UNDYED

## (undated) DEVICE — ENDOCATCH II 15MM

## (undated) DEVICE — VENODYNE/SCD SLEEVE CALF MEDIUM

## (undated) DEVICE — POSITIONER PINK PAD PIGAZZI SYSTEM

## (undated) DEVICE — GOWN TRIMAX LG

## (undated) DEVICE — TUBING SUCTION 20FT

## (undated) DEVICE — SUT POLYSORB 2-0 30" V-20 UNDYED

## (undated) DEVICE — FOLEY TRAY 16FR LF URINE METER SURESTEP

## (undated) DEVICE — TUBING TUR 2 PRONG

## (undated) DEVICE — XI OBTURATOR OPTICAL BLADELESS 8MM

## (undated) DEVICE — PREP CHLORAPREP HI-LITE ORANGE 26ML

## (undated) DEVICE — STAPLER SKIN VISI-STAT 35 WIDE

## (undated) DEVICE — LONE STAR RETRACTOR RING 32.5CM X 18.3CM DISP

## (undated) DEVICE — SHEARS COVIDIEN ENDO SHEAR 5MM X 31CM W UNIPOLAR CAUTERY

## (undated) DEVICE — GLV 6.5 PROTEXIS (WHITE)

## (undated) DEVICE — XI ARM FORCEP PROGRASP 8MM

## (undated) DEVICE — SUT BIOSYN 4-0 18" P-12

## (undated) DEVICE — UTERINE MANIPULATOR CONMED VCARE LG 37MM

## (undated) DEVICE — SYR LUER LOK 20CC

## (undated) DEVICE — APPLICATOR ENDOSCOPIC FOR SUGIFLO

## (undated) DEVICE — SUT BIOSYN 2-0 27" V-20

## (undated) DEVICE — PACK GYN LAPAROSCOPY

## (undated) DEVICE — DRAPE MAYO STAND 30"

## (undated) DEVICE — PREP BETADINE KIT

## (undated) DEVICE — SUT POLYSORB 0 30" GS-23

## (undated) DEVICE — WARMING BLANKET UPPER ADULT

## (undated) DEVICE — SOL IRR POUR H2O 250ML

## (undated) DEVICE — LUBRICANT INST ELECTROLUBE Z SOLUTION

## (undated) DEVICE — XI ARM NEEDLE DRIVER SUTURECUT MEGA 8MM

## (undated) DEVICE — LAP PAD 18 X 18"

## (undated) DEVICE — DRSG DERMABOND 0.7ML